# Patient Record
Sex: FEMALE | Race: WHITE
[De-identification: names, ages, dates, MRNs, and addresses within clinical notes are randomized per-mention and may not be internally consistent; named-entity substitution may affect disease eponyms.]

---

## 2018-12-10 ENCOUNTER — HOSPITAL ENCOUNTER (EMERGENCY)
Dept: HOSPITAL 74 - JERFT | Age: 43
Discharge: HOME | End: 2018-12-10
Payer: COMMERCIAL

## 2018-12-10 VITALS — DIASTOLIC BLOOD PRESSURE: 82 MMHG | TEMPERATURE: 98.4 F | HEART RATE: 61 BPM | SYSTOLIC BLOOD PRESSURE: 122 MMHG

## 2018-12-10 VITALS — BODY MASS INDEX: 22.2 KG/M2

## 2018-12-10 DIAGNOSIS — N39.0: Primary | ICD-10-CM

## 2018-12-10 DIAGNOSIS — R30.0: ICD-10-CM

## 2018-12-10 LAB
APPEARANCE UR: (no result)
BACTERIA #/AREA URNS HPF: (no result) /HPF
BILIRUB UR STRIP.AUTO-MCNC: NEGATIVE MG/DL
COLOR UR: YELLOW
EPITH CASTS URNS QL MICRO: (no result) /HPF
KETONES UR QL STRIP: NEGATIVE
LEUKOCYTE ESTERASE UR QL STRIP.AUTO: NEGATIVE
MUCOUS THREADS URNS QL MICRO: (no result)
NITRITE UR QL STRIP: POSITIVE
PH UR: 5 [PH] (ref 5–8)
PROT UR QL STRIP: NEGATIVE
PROT UR QL STRIP: NEGATIVE
SP GR UR: 1.03 (ref 1.01–1.03)
UROBILINOGEN UR STRIP-MCNC: 2 MG/DL (ref 0.2–1)

## 2018-12-10 NOTE — PDOC
History of Present Illness





- General


Chief Complaint: Pain


Stated Complaint: ABD PAIN


Time Seen by Provider: 12/10/18 19:34





- History of Present Illness


Initial Comments: 





12/10/18 20:17


43-year-old female without comorbidities presents for evaluation of dysuria 

times one week without systemic symptoms





Past History





- Past Medical History


Allergies/Adverse Reactions: 


 Allergies











Allergy/AdvReac Type Severity Reaction Status Date / Time


 


contrast Allergy   Uncoded 12/10/18 19:36











Home Medications: 


Ambulatory Orders





Labetalol HCl 100 mg PO ASDIR 12/10/18 


Nitrofurantoin Monohyd/M-Cryst [Macrobid -] 100 mg PO BID #14 capsule 12/10/18 








COPD: No





- Surgical History


Abdominal Surgery: Yes ([facility])





- Suicide/Smoking/Psychosocial Hx


Smoking History: Never smoked


Have you smoked in the past 12 months: No


Information on smoking cessation initiated: No


Hx Alcohol Use: No


Drug/Substance Use Hx: No


Substance Use Type: None





**Review of Systems





- Review of Systems


Constitutional: No: Fever


: Yes: Dysuria





*Physical Exam





- Vital Signs


 Last Vital Signs











Temp Pulse Resp BP Pulse Ox


 


 98.4 F   61   18   122/82   100 


 


 12/10/18 19:33  12/10/18 19:33  12/10/18 19:33  12/10/18 19:33  12/10/18 19:33














- Physical Exam


Comments: 





12/10/18 20:17


HEAD: NC/AT


EYES: Conjuntiva clear


MS: Full ROM in all joints without edema 


NEUROLOGIC: No gross sensory or motor deficits, NVID


SKIN: Normal color and temperature no lesions or rashes





Moderate Sedation





- Procedure Monitoring


Vital Signs: 


Procedure Monitoring Vital Signs











Temperature  98.4 F   12/10/18 19:33


 


Pulse Rate  61   12/10/18 19:33


 


Respiratory Rate  18   12/10/18 19:33


 


Blood Pressure  122/82   12/10/18 19:33


 


O2 Sat by Pulse Oximetry (%)  100   12/10/18 19:33











*DC/Admit/Observation/Transfer


Diagnosis at time of Disposition: 


 Dysuria, UTI (urinary tract infection)








- Discharge Dispostion


Disposition: HOME


Condition at time of disposition: Stable


Decision to Admit order: No





- Prescriptions


Prescriptions: 


Nitrofurantoin Monohyd/M-Cryst [Macrobid -] 100 mg PO BID #14 capsule





- Referrals


Referrals: 


Nilsa Bagley MD [Staff Physician] - 





- Patient Instructions


Printed Discharge Instructions:  Urinary Tract Infection


Additional Instructions: 


Is take the antibiotic as directed return to the emergency room should symptoms 

worsen or go unresolved and follow-up with the primary care physician one to 2 

days





- Post Discharge Activity

## 2018-12-10 NOTE — PDOC
Rapid Medical Evaluation


Chief Complaint: Pain


Time Seen by Provider: 12/10/18 19:34


Medical Evaluation: 


 Allergies











Allergy/AdvReac Type Severity Reaction Status Date / Time


 


contrast Allergy   Uncoded 10/20/16 09:45











12/10/18 19:35


I have performed a brief in-person evaluation of this patient.





The patient presents with a chief complaint of:dysuria x 2 days. H/o HTN





Pertinent physical exam findings:Unremarkable





I have ordered the following:ua/cx/upreg





The patient will proceed to the ED for further evaluation





**Discharge Disposition





- Diagnosis


 Dysuria








- Referrals





- Patient Instructions





- Post Discharge Activity